# Patient Record
(demographics unavailable — no encounter records)

---

## 2024-11-05 NOTE — PHYSICAL EXAM
[EOMI] : grossly EOMI [Normotonic] : normotonic [+2 Patella DTR] : +2 patella DTR [NL] : warm, clear [de-identified] : no dysmetria normal tandem walk no tongue fasiculations excellent strength

## 2024-11-05 NOTE — HISTORY OF PRESENT ILLNESS
[de-identified] : temporal headaches [FreeTextEntry6] : patient has developed bitemporal headaches since friday now day 5. no history of trauma no obvious illness. has ADHD and has been on focalin XR and focalin short acting as well as guanfacine w/o issues dad states he does not go to sleep until 10:30 and arises at 6:30 also feels he does not hydrate enough and appetite suppression from the meds.the other variable may well be seasonal with the recent temperature fluctuations . patient denies any new stressors.

## 2024-11-05 NOTE — REVIEW OF SYSTEMS
[Headache] : headache [Negative] : Genitourinary [Eye Discharge] : no eye discharge [Eye Redness] : no eye redness [Itchy Eyes] : no itchy eyes [Ear Pain] : no ear pain [Nasal Discharge] : no nasal discharge [Sinus Pressure] : no sinus pressure [Hypertonicity] : not hypertonic [Hypotonicity] : not hypotonic [Weakness] : no weakness [Lightheadness] : no lightheadness [Dizziness] : no dizziness

## 2024-11-05 NOTE — DISCUSSION/SUMMARY
[FreeTextEntry1] : temporal headaches of uncertain origin not concerned that these are significant and no need to pusue imaging at this time. dad raised need for imaging which I negated. More likely stress;seasonal change;inadequate hydration and/or needs more robust eating habits. observe keep a diary. need for additional workup to be determined by his course.

## 2024-11-09 NOTE — PHYSICAL EXAM
[Normotonic] : normotonic [+2 Patella DTR] : +2 patella DTR [NL] : warm, clear [FreeTextEntry1] : cooperative and alert  [de-identified] : Neurological exam normal.  FIELDS OF VISION TO CONFRONTATION - NORMAL. Gross CNN intact, Cerebellar functions normal. Tandem gait, Finger to nose, Rhomberg all normal. Symmetric reflexes. Fields of vision normal. Sensory intact. Strength equal UE and LE. Mental status normal.

## 2024-11-09 NOTE — DISCUSSION/SUMMARY
[FreeTextEntry1] : Agree likely muscular type headaches given persistence and intensity will image at this time will set up for MRI for now hydration rest prn pain meds RTO PRN advised on signs and symptoms requiring re evaluation and concern.

## 2024-11-09 NOTE — HISTORY OF PRESENT ILLNESS
[de-identified] : headaches [FreeTextEntry6] : 10 6/12 jose d old boy with ADHD and day 9 headaches. First seen Nov 5 for same. HA persist bilateral temporal and sometimes occipital. Have been daily  usually 2x a day. Are present in morning on awakening and gradually improve but recur later in day. On one occasion awoke due to "nightmare" at 3:30 AM and had headache. Rates HA at 6-7 usually but up to 10/10 like last PM after hockey. pressure like vs pulsatile in nature but mostly ill defined in character per Santos. No head injury or reason to consider concussion. Exercise seems to exacerbate HA. No prior Hx HA. No vomiting or nausea appetite ok no seizures or eye or hearing impairment. gets a bit dizzy with HA eds - review and has been on this regimen for a while now

## 2024-12-19 NOTE — PHYSICAL EXAM
[NL] : moves all extremities x4, warm, well perfused x4 [de-identified] : eczema patch to left elbow

## 2024-12-19 NOTE — DISCUSSION/SUMMARY
[FreeTextEntry1] : Atopic dermatitis is a common pediatric condition made worse by dry environment. The treatment involves using moisturizers such as cetaphil, aquaphor,or cereve. Moisturizing soaps such as Dove or Cetaphil can also be used. And, at times, steroids creams for 7-10 days. Moisturizers should be applied after bathing and even before the skin is totally dry to allow moisture to remain in the skin.